# Patient Record
Sex: MALE | Race: AMERICAN INDIAN OR ALASKA NATIVE
[De-identification: names, ages, dates, MRNs, and addresses within clinical notes are randomized per-mention and may not be internally consistent; named-entity substitution may affect disease eponyms.]

---

## 2017-02-28 ENCOUNTER — HOSPITAL ENCOUNTER (EMERGENCY)
Dept: HOSPITAL 5 - ED | Age: 22
LOS: 3 days | Discharge: PSYCHIATRIC OR SUBSTANCE USE FACILITY | End: 2017-03-03
Payer: SELF-PAY

## 2017-02-28 DIAGNOSIS — F31.9: Primary | ICD-10-CM

## 2017-02-28 DIAGNOSIS — R45.850: ICD-10-CM

## 2017-02-28 DIAGNOSIS — F17.200: ICD-10-CM

## 2017-02-28 DIAGNOSIS — F12.10: ICD-10-CM

## 2017-02-28 DIAGNOSIS — R45.851: ICD-10-CM

## 2017-02-28 LAB
ANION GAP SERPL CALC-SCNC: 17 MMOL/L
BILIRUB UR QL STRIP: (no result)
BLASTOCYTES % (MANUAL): 0 %
BLOOD UR QL VISUAL: (no result)
BUN SERPL-MCNC: 9 MG/DL (ref 9–20)
BUN/CREAT SERPL: 11.25 %
CALCIUM SERPL-MCNC: 9.1 MG/DL (ref 8.4–10.2)
CHLORIDE SERPL-SCNC: 98.3 MMOL/L (ref 98–107)
CO2 SERPL-SCNC: 25 MMOL/L (ref 22–30)
GLUCOSE SERPL-MCNC: 90 MG/DL (ref 75–100)
HCT VFR BLD CALC: 39.8 % (ref 35.5–45.6)
HGB BLD-MCNC: 12.7 GM/DL (ref 11.8–15.2)
KETONES UR STRIP-MCNC: (no result) MG/DL
LEUKOCYTE ESTERASE UR QL STRIP: (no result)
MCH RBC QN AUTO: 28 PG (ref 28–32)
MCHC RBC AUTO-ENTMCNC: 32 % (ref 32–34)
MCV RBC AUTO: 89 FL (ref 84–94)
NITRITE UR QL STRIP: (no result)
PH UR STRIP: 6 [PH] (ref 5–7)
PLATELET # BLD: 224 K/MM3 (ref 140–440)
POTASSIUM SERPL-SCNC: 4.1 MMOL/L (ref 3.6–5)
PROT UR STRIP-MCNC: (no result) MG/DL
RBC # BLD AUTO: 4.49 M/MM3 (ref 3.65–5.03)
RBC #/AREA URNS HPF: 2 /HPF (ref 0–6)
SODIUM SERPL-SCNC: 136 MMOL/L (ref 137–145)
TOTAL CELLS COUNTED PERCENT: 16
URINE DRUGS OF ABUSE NOTE: (no result)
UROBILINOGEN UR-MCNC: < 2 MG/DL (ref ?–2)
WBC # BLD AUTO: 3.6 K/MM3 (ref 4.5–11)
WBC #/AREA URNS HPF: < 1 /HPF (ref 0–6)

## 2017-02-28 PROCEDURE — 80320 DRUG SCREEN QUANTALCOHOLS: CPT

## 2017-02-28 PROCEDURE — 80048 BASIC METABOLIC PNL TOTAL CA: CPT

## 2017-02-28 PROCEDURE — 96372 THER/PROPH/DIAG INJ SC/IM: CPT

## 2017-02-28 PROCEDURE — 85007 BL SMEAR W/DIFF WBC COUNT: CPT

## 2017-02-28 PROCEDURE — 85025 COMPLETE CBC W/AUTO DIFF WBC: CPT

## 2017-02-28 PROCEDURE — 80307 DRUG TEST PRSMV CHEM ANLYZR: CPT

## 2017-02-28 PROCEDURE — 99285 EMERGENCY DEPT VISIT HI MDM: CPT

## 2017-02-28 PROCEDURE — 36415 COLL VENOUS BLD VENIPUNCTURE: CPT

## 2017-02-28 PROCEDURE — G0480 DRUG TEST DEF 1-7 CLASSES: HCPCS

## 2017-02-28 PROCEDURE — 81001 URINALYSIS AUTO W/SCOPE: CPT

## 2017-02-28 NOTE — EMERGENCY DEPARTMENT REPORT
Chief Complaint: Psych


Stated Complaint: BIPOLAR/SUICIDAL


Time Seen by Provider: 02/28/17 19:27





- HPI


History of Present Illness: 





Patient presents with being out seroquel since Oct 2016 and a history of 

bipolar disease.  He states he lost his insurance and this is why he has not 

had his medication.  He presents with his mother.  Patient states he is having 

thoughts of suicide but does not have a plan.  He has also thought of hurting 

other people due to being easily frustrated and mad.  He is currently living 

with is mother.





- ROS


Review of Systems: 





All other systems unremarkable except documentation in HPI





- Exam


Vital Signs: 


 Vital Signs











  02/28/17





  19:04


 


Temperature 98.4 F


 


Pulse Rate 62


 


Respiratory 20





Rate 


 


Blood Pressure 116/59


 


O2 Sat by Pulse 100





Oximetry 











Physical Exam: 





General: Male no apparent distress noted, ambulatory, he answers questions and 

does not appear to be agitated.


Cardiovascular: Heart sounds present S1-S2, no murmur, gallop, edema or ectopy 

noted, 2+ pulses upper and lower extremities


Respiratory: Chest symmetry with respirations, lungs clear to auscultate upper 

and lower lobes, respirations even and unlabored, no rales, rhonchi, crackles 

noted. 


Neuro: Alert and oriented 3, normal gait, fluid speech, EOMs intact, normal 

facial sensation, strength exam 5/5 upper and lower extremities, GCS equals 15, 


Psych: AxOx3, answers questions appropriately, mood full range, affect normal, 

normal speech and tone.


MSE screening note: 


Focused history and physical exam performed.


Due to findings the following was ordered:





seen by provider, laboratory studies ordered, and to go to main ED to be seen 

by physician





ED Medical Decision Making





- Medical Decision Making





seen by provider, laboratory studies ordered, and to go to main ED to be seen 

by physician





ED Disposition for MSE


Condition: Stable
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
## 2017-03-02 NOTE — PROGRESS NOTE
Subjective





- Reason for Consult


Consult date: 03/02/17


Reason for consult: awaiting placement at psychiatric facility





- Chief Complaint


Chief complaint: 





"I got mad and blacked out."





Mental Status Exam





- Vital signs


 Last Vital Signs











Temp  98.9 F   03/02/17 00:34


 


Pulse  98 H  03/02/17 00:34


 


Resp  18   03/02/17 00:34


 


BP  132/58   03/02/17 00:34


 


Pulse Ox  100   03/02/17 00:34














- Exam


Narrative exam: 





Appearance: Alert male, appropriate hygiene and grooming, casually dressed


Behavior: mostly cooperative, fair eye contact, well related


Psychomotor: no PMA/R


Speech: normal rate, tone, volume, normal prosody 


Mood:  mostly worried"


Affect: constricted, withdrawn


Thought Process: linear, organized 


Thought Content: -AH, -VH, +SI, -HI


Insight:  good


Judgment:  not impulsive, good 





Assessment and Plan





Placement has been found for this patient at [place] and he will be transferred 

today.

## 2017-03-03 VITALS — SYSTOLIC BLOOD PRESSURE: 130 MMHG | DIASTOLIC BLOOD PRESSURE: 68 MMHG

## 2017-03-03 NOTE — PROGRESS NOTE
Subjective





- Reason for Consult


Consult date: 03/03/17


Reason for consult: psychiatric follow up





- Chief Complaint


Chief complaint: 





"I got mad and blacked out."





Mental Status Exam





- Vital signs


 Last Vital Signs











Temp  97.8 F   03/02/17 21:00


 


Pulse  82   03/02/17 21:00


 


Resp  20   03/03/17 02:20


 


BP  126/74   03/02/17 21:00


 


Pulse Ox  100   03/03/17 02:20














- Exam


Narrative exam: 





Appearance: Alert male, appropriate hygiene and grooming, casually dressed


Behavior: mostly cooperative, fair eye contact, well related


Psychomotor: no PMA/R


Speech: normal rate, tone, volume, normal prosody 


Mood: irritable


Affect: constricted, withdrawn


Thought Process: linear, organized 


Thought Content: -AH, -VH, -SI, -HI


Insight:  good


Judgment:  not impulsive, good 





Reports an episode of agitation last night. He discussed how he saw other 

patients being treated problem behaviors and he thought someone would do that 

to him. He required PRN antipsychotics.





Assessment and Plan





Recommendation: 


Continue Seroquel and increase dose to 100mg hs for mood/aggression


Start Depakote 500mg, 2 tabs by mouth at bedtime for mood stabilization.


Recommend transfer to inpatient psychiatric hospital. Has acceptance at [facility] and will be transferred 3/3/17 night.








- Patient Problems


(1) Bipolar disorder


Current Visit: Yes   Status: Acute   


Qualifiers: 


   Active/Remission status: A   Current bipolar episode type: C   Current 

episode severity: C   Psychotic features: P   Most recent bipolar episode type: 

M

## 2021-04-15 ENCOUNTER — HOSPITAL ENCOUNTER (EMERGENCY)
Dept: HOSPITAL 5 - ED | Age: 26
LOS: 1 days | Discharge: HOME | End: 2021-04-16
Payer: MEDICAID

## 2021-04-15 DIAGNOSIS — S40.022A: ICD-10-CM

## 2021-04-15 DIAGNOSIS — Z79.899: ICD-10-CM
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
## 2021-04-15 DIAGNOSIS — M62.830: ICD-10-CM
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
## 2021-04-15 DIAGNOSIS — R56.9: ICD-10-CM
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
## 2021-04-15 DIAGNOSIS — Z79.1: ICD-10-CM

## 2021-04-15 DIAGNOSIS — S53.402A: Primary | ICD-10-CM
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
## 2021-04-15 DIAGNOSIS — F31.9: ICD-10-CM

## 2021-04-15 NOTE — XRAY REPORT
LEFT ELBOW 4 VIEW(S)



INDICATION / CLINICAL INFORMATION:

PAIN RELATED TO FALL



COMPARISON:

None available.

 

FINDINGS:



BONES / JOINT(S): No acute fracture or subluxation. No significant arthritis.



SOFT TISSUES: No significant abnormality.



ADDITIONAL FINDINGS: None.







Signer Name: Jorge Larry MD 

Signed: 4/15/2021 9:28 PM

Workstation Name: [id number]

## 2021-04-16 VITALS — DIASTOLIC BLOOD PRESSURE: 62 MMHG | SYSTOLIC BLOOD PRESSURE: 126 MMHG

## 2021-04-16 NOTE — EMERGENCY DEPARTMENT REPORT
ED Fall HPI





- General


Chief Complaint: Extremity Injury, Upper


Stated Complaint: LEFT ARM INJURY


Source: patient


Mode of arrival: Ambulatory





- History of Present Illness


Initial Comments: 





Patient is a 25-year-old male with a history of bipolar disorder who presents to

the ED with complaint of acute onset persistent severe left forearm and left 

elbow pain as well as low back pain after he slipped and fell off a balcony 

floor about 7 hours ago, landed on the left forearm left elbow.  Patient states 

that the pain is constant, worse with any active range of motion of left arm.  

Patient states that he has not taken any medications prior to arrival. Patient 

denies dizziness, syncope, seizures, suicidal ideation, homicidal ideations, 

neck injuries, headache, head injury, chest pain or shortness of breath, nausea 

and vomiting, loss of consciousness, numbness and tingling or weakness of upper 

and lower


MD Complaint: fall


-: Sudden, hour(s) (7)


Fall From: standing, other (fell off a balcony)


When Fall Occurred: 4-6 hours PTA


Fall Witnessed: yes, by bystander


Place Fall Occurred: home


Loss of Consciousness: none


Prolonged Down Time?: no


Symptoms Prior to Fall: none


Location: back (lower back), other (Left elbow and left forearm pain; low back 

pain)


Location - Extremities: Left: Elbow (pain), Forearm (pain)


Severity: severe


Severity scale (0 -10): 8


Quality: sharp, aching


Context: tripped/slipped


Associated Symptoms: denies.  denies: headache, neck pain, numbness, weakness, 

chest paint, shortness of breath, abdominal pain, hematuria, unable to walk, 

lightheaded, vertigo, confusion, other





- Related Data


                                Home Medications











 Medication  Instructions  Recorded  Confirmed  Last Taken


 


QUEtiapine [SEROquel] 25 mg PO QHS 06/04/15 02/28/17 06/03/15








                                  Previous Rx's











 Medication  Instructions  Recorded  Last Taken  Type


 


Baclofen 20 mg PO Q8H PRN #21 tablet 21 Unknown Rx


 


Ibuprofen [Motrin] 800 mg PO Q8HR PRN #30 tablet 21 Unknown Rx


 


traMADoL [Ultram] 50 mg PO Q6HR PRN #10 tablet 21 Unknown Rx











                                    Allergies











Allergy/AdvReac Type Severity Reaction Status Date / Time


 


No Known Allergies Allergy   Verified 17 22:30














ED Review of Systems


ROS: 


Stated complaint: LEFT ARM INJURY


Other details as noted in HPI





Constitutional: denies: chills, fever


Eyes: denies: eye pain, eye discharge, vision change


ENT: denies: ear pain, throat pain


Respiratory: denies: cough, shortness of breath, wheezing


Cardiovascular: denies: chest pain, palpitations


Endocrine: no symptoms reported


Gastrointestinal: denies: abdominal pain, nausea, diarrhea


Genitourinary: denies: urgency, dysuria


Musculoskeletal: back pain (lower back pain), arthralgia (left elbow pain), 

myalgia.  denies: joint swelling


Skin: denies: rash, lesions


Neurological: denies: headache, weakness, paresthesias


Psychiatric: denies: anxiety, depression


Hematological/Lymphatic: denies: easy bleeding, easy bruising





ED Past Medical Hx





- Past Medical History


Hx Seizures: Yes


Hx Psychiatric Treatment: Yes (bipolar,)





- Social History


Smoking Status: Never Smoker


Substance Use Type: None





- Medications


Home Medications: 


                                Home Medications











 Medication  Instructions  Recorded  Confirmed  Last Taken  Type


 


QUEtiapine [SEROquel] 25 mg PO QHS 06/04/15 02/28/17 06/03/15 History


 


Baclofen 20 mg PO Q8H PRN #21 tablet 21  Unknown Rx


 


Ibuprofen [Motrin] 800 mg PO Q8HR PRN #30 tablet 21  Unknown Rx


 


traMADoL [Ultram] 50 mg PO Q6HR PRN #10 tablet 21  Unknown Rx














ED Physical Exam





- General


Limitations: No Limitations


General appearance: alert, in no apparent distress





- Head


Head exam: Present: atraumatic, normocephalic, normal inspection





- Eye


Eye exam: Present: normal appearance, PERRL, EOMI


Pupils: Present: normal accommodation





- ENT


ENT exam: Present: normal exam, normal orophraynx, mucous membranes moist, TM's 

normal bilaterally, normal external ear exam





- Neck


Neck exam: Present: normal inspection, full ROM.  Absent: tenderness





- Respiratory


Respiratory exam: Present: normal lung sounds bilaterally.  Absent: respiratory 

distress, wheezes, rales, rhonchi, chest wall tenderness, accessory muscle use, 

decreased breath sounds, prolonged expiratory





- Cardiovascular


Cardiovascular Exam: Present: regular rate, normal rhythm, normal heart sounds. 

 Absent: systolic murmur, diastolic murmur, rubs, gallop





- GI/Abdominal


GI/Abdominal exam: Present: soft, normal bowel sounds.  Absent: tenderness, 

guarding, rebound, hyperactive bowel sounds, hypoactive bowel sounds, 

organomegaly





- Extremities Exam


Extremities exam: Present: normal inspection, tenderness (Palpable severe left 

elbow tenderness with limited ROM due to pain), normal capillary refill.  

Absent: full ROM (Range of motion of left elbow due to pain), pedal edema, joint

 swelling, calf tenderness





- Back Exam


Back exam: Present: normal inspection, full ROM, tenderness (Palpable 

lumbosacral paraspinal musculoskeletal), muscle spasm, paraspinal tenderness





- Neurological Exam


Neurological exam: Present: alert, oriented X3, CN II-XII intact, normal gait, 

reflexes normal





- Psychiatric


Psychiatric exam: Present: normal affect, normal mood





- Skin


Skin exam: Present: warm, dry, intact, normal color.  Absent: rash





ED Course





                                   Vital Signs











  04/15/21





  20:47


 


Temperature 99.1 F


 


Pulse Rate 79


 


Respiratory 18





Rate 


 


O2 Sat by Pulse 100





Oximetry 














ED Medical Decision Making





- Radiology Data


Radiology results: report reviewed, image reviewed





[address] [address]  


 


                                            XRay Report   


                                               Signed  


 


Patient: TEJAS HEWITT JR.                                                 

               MR#:   


[id number]          


: 1995                                                                

Acct:[id number]      


 


Age/Sex: 25 / M                                                                

ADM Date: 04/15/21     


 


Loc: ED       


Attending Dr:   


 


 


Ordering Physician: KARLENE MONZON  


Date of Service: 04/15/21  


Procedure(s): XR elbow 3+V LT  


Accession Number(s): [id number]  


 


cc: KARLENE MONZON   


 


Fluoro Time In Minutes:   


 


LEFT ELBOW 4 VIEW(S)  


 


 INDICATION / CLINICAL INFORMATION:  


 PAIN RELATED TO FALL  


 


 COMPARISON:  


 None available.  


 


 FINDINGS:  


 


 BONES / JOINT(S): No acute fracture or subluxation. No significant arthritis.  


 


 SOFT TISSUES: No significant abnormality.  


 


 ADDITIONAL FINDINGS: None.  


 


 


 


 Signer Name: Sheila Larry MD   


 Signed: 4/15/2021 9:28 PM  


 Workstation Name: [id number]   


 


 


Transcribed By:   


Dictated By: SHEILA LARRY  


Electronically Authenticated By: SHEILA LARRY    


Signed Date/Time: 04/15/21 2128                                


 


 


 


DD/DT: 04/15/21 2127                                                            

  


TD/TT:





























- Medical Decision Making





This is a 25-year-old male with a history of bipolar disorder who presents to 

the ED with complaint of acute onset persistent severe left forearm and left 

elbow pain as well as low back pain after he slipped and fell off a balcony [place]

or about 7 hours ago, landed on the left forearm left elbow.  Patient states 

that the pain is constant, worse with any active range of motion of left arm.  

Patient states that he has not taken any medications prior to arrival.  In the 

ED, ivon is alert and oriented x3 and is in no acute distress but appears to

 be in pain.  Left elbow x-ray shows no acute fractures or subluxations.  

Patient was treated for pain in the ED and on reevaluation, patient's pain is 

well controlled with medications.  Patient left arm was immobilized in an arm 

sling and patient will discharge home on pain medications and muscle relaxants 

and advised to follow-up with his primary care physician in 5 to 7 days for 

reevaluation.  Patient was advised to return to the ED immediately if symptoms 

progress.





- Differential Diagnosis


Forearm fracture; Elbow fracture; forearm contusion; muscle spasm


Critical care attestation.: 


If time is entered above; I have spent that time in minutes in the direct care 

of this critically ill patient, excluding procedure time.








ED Disposition


Clinical Impression: 


 Contusion of left forearm, initial encounter, Spasm of muscle of lower back





Sprain of left elbow


Qualifiers:


 Encounter type: initial encounter Qualified Code(s): S53.402A - Unspecified 

sprain of left elbow, initial encounter





Disposition: DC-01 TO HOME OR SELFCARE


Is pt being admited?: No


Does the pt Need Aspirin: No


Condition: Stable


Instructions:  Muscle Cramps and Spasms, Easy-to-Read, Back Injury Prevention, 

Easy-to-Read, Contusion, Easy-to-Read, Elbow Sprain


Additional Instructions: 


The x-ray of your left elbow showed no acute fractures or subluxations.  

Therefore your injuries are due to muscle strain, muscle spasm or contusion of 

the left arm and low back.  Therefore take medications with food as needed for 

pain, drink plenty of fluids and follow-up with your primary care physician in 5

 to 7 days for reevaluation.  Return to the ED immediately if symptoms get 

worse.


Prescriptions: 


Baclofen 20 mg PO Q8H PRN #21 tablet


 PRN Reason: Muscle Spasm


Ibuprofen [Motrin] 800 mg PO Q8HR PRN #30 tablet


 PRN Reason: Pain , Severe (7-10)


traMADoL [Ultram] 50 mg PO Q6HR PRN #10 tablet


 PRN Reason: Pain


Referrals: 


[facility] [Provider Group] - 3-5 Days


Forms:  Work/School Release Form(ED)


Time of Disposition: 02:02


Print Language: ENGLISH
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
## 2021-09-16 NOTE — EVENT NOTE
Date: 09/16/21





The patient was evaluated in the emergency department for symptoms described in 

the history of present illness.  He/she was evaluated in the context of the 

global COVID-19 pandemic, which necessitated consideration that the patient 

might be at risk for infection with the virus that causes COVID-19.  

Institutional protocols and algorithms that pertain to the evaluation of 

patients at risk for COVID-19 are in a state of rapid change based on 

information released by regulatory bodies including the CDC and federal and 

state organizations.  These policies and algorithms were followed during the 

patient's care in the emergency department.  Please note that these policies, 

procedures and recommendations changed on a rapid basis.








Laboratory studies, ER documentation, psychiatric documentation nursing and 

ancillary documentation reviewed and appreciated.  The patient was deemed 

medically suitable for psychiatric placement and disposition this morning.





The patient is resting comfortably in his chair, and he is in no acute distress.

 Nursing team endorses no acute issues or concerns.  Patient is documented as 

having watched TV, and eating meals without difficulty.





This patient remains medically suitable for psychiatric placement and 

disposition at this time.


As needed medications ordered.  Covid swab pending


                                   Vital Signs











  09/15/21 09/15/21 09/15/21





  19:28 19:59 22:31


 


Temperature 98.5 F 97.7 F 


 


Pulse Rate 62 60 


 


Respiratory 20 18 20





Rate   


 


Blood Pressure 146/76 120/59 





[Left]   


 


O2 Sat by Pulse 100 100 99





Oximetry   








                                   Lab Results











  09/15/21 09/15/21 09/15/21 Range/Units





  19:01 19:01 22:36 


 


WBC     (4.5-11.0)  K/mm3


 


RBC     (3.65-5.03)  M/mm3


 


Hgb     (11.8-15.2)  gm/dl


 


Hct     (35.5-45.6)  %


 


MCV     (84-94)  fl


 


MCH     (28-32)  pg


 


MCHC     (32-34)  %


 


RDW     (13.2-15.2)  %


 


Plt Count     (140-440)  K/mm3


 


Sodium  137    (137-145)  mmol/L


 


Potassium  4.0    (3.6-5.0)  mmol/L


 


Chloride  103.3    ()  mmol/L


 


Carbon Dioxide  27    (22-30)  mmol/L


 


Anion Gap  11    mmol/L


 


BUN  12    (9-20)  mg/dL


 


Creatinine  0.8    (0.8-1.3)  mg/dL


 


Estimated GFR  > 60    ml/min


 


BUN/Creatinine Ratio  15    %


 


Glucose  86    ()  mg/dL


 


Calcium  9.1    (8.4-10.2)  mg/dL


 


Urine Color     (Yellow)  


 


Urine Turbidity     (Clear)  


 


Urine pH     (5.0-7.0)  


 


Ur Specific Gravity     (1.003-1.030)  


 


Urine Protein     (Negative)  mg/dL


 


Urine Glucose (UA)     (Negative)  mg/dL


 


Urine Ketones     (Negative)  mg/dL


 


Urine Blood     (Negative)  


 


Urine Nitrite     (Negative)  


 


Urine Bilirubin     (Negative)  


 


Urine Urobilinogen     (<2.0)  mg/dL


 


Ur Leukocyte Esterase     (Negative)  


 


Urine WBC (Auto)     (0.0-6.0)  /HPF


 


Urine RBC (Auto)     (0.0-6.0)  /HPF


 


Urine Mucus     /HPF


 


Urine Opiates Screen    Negative  


 


Urine Methadone Screen    Negative  


 


Ur Barbiturates Screen    Negative  


 


Ur Phencyclidine Scrn    Negative  


 


Ur Amphetamines Screen    Negative  


 


U Benzodiazepines Scrn    Negative  


 


Urine Cocaine Screen    Negative  


 


U Marijuana (THC) Screen    Negative  


 


Drugs of Abuse Note    Disclamer  


 


Plasma/Serum Alcohol   < 0.01   (0-0.07)  %














  09/15/21 09/15/21 Range/Units





  22:36 Unknown 


 


WBC   4.4 L  (4.5-11.0)  K/mm3


 


RBC   4.68  (3.65-5.03)  M/mm3


 


Hgb   13.9  (11.8-15.2)  gm/dl


 


Hct   41.6  (35.5-45.6)  %


 


MCV   89  (84-94)  fl


 


MCH   30  (28-32)  pg


 


MCHC   33  (32-34)  %


 


RDW   13.1 L  (13.2-15.2)  %


 


Plt Count   301  (140-440)  K/mm3


 


Sodium    (137-145)  mmol/L


 


Potassium    (3.6-5.0)  mmol/L


 


Chloride    ()  mmol/L


 


Carbon Dioxide    (22-30)  mmol/L


 


Anion Gap    mmol/L


 


BUN    (9-20)  mg/dL


 


Creatinine    (0.8-1.3)  mg/dL


 


Estimated GFR    ml/min


 


BUN/Creatinine Ratio    %


 


Glucose    ()  mg/dL


 


Calcium    (8.4-10.2)  mg/dL


 


Urine Color  Yellow   (Yellow)  


 


Urine Turbidity  Clear   (Clear)  


 


Urine pH  5.0   (5.0-7.0)  


 


Ur Specific Gravity  1.028   (1.003-1.030)  


 


Urine Protein  30 mg/dl   (Negative)  mg/dL


 


Urine Glucose (UA)  Neg   (Negative)  mg/dL


 


Urine Ketones  Tr   (Negative)  mg/dL


 


Urine Blood  Neg   (Negative)  


 


Urine Nitrite  Neg   (Negative)  


 


Urine Bilirubin  Neg   (Negative)  


 


Urine Urobilinogen  < 2.0   (<2.0)  mg/dL


 


Ur Leukocyte Esterase  Neg   (Negative)  


 


Urine WBC (Auto)  4.0   (0.0-6.0)  /HPF


 


Urine RBC (Auto)  2.0   (0.0-6.0)  /HPF


 


Urine Mucus  3+   /HPF


 


Urine Opiates Screen    


 


Urine Methadone Screen    


 


Ur Barbiturates Screen    


 


Ur Phencyclidine Scrn    


 


Ur Amphetamines Screen    


 


U Benzodiazepines Scrn    


 


Urine Cocaine Screen    


 


U Marijuana (THC) Screen    


 


Drugs of Abuse Note    


 


Plasma/Serum Alcohol    (0-0.07)  %